# Patient Record
Sex: FEMALE | Race: WHITE | Employment: FULL TIME | URBAN - METROPOLITAN AREA
[De-identification: names, ages, dates, MRNs, and addresses within clinical notes are randomized per-mention and may not be internally consistent; named-entity substitution may affect disease eponyms.]

---

## 2017-02-07 ENCOUNTER — APPOINTMENT (OUTPATIENT)
Dept: OTHER | Facility: HOSPITAL | Age: 42
End: 2017-02-07
Attending: PREVENTIVE MEDICINE

## 2017-02-23 ENCOUNTER — APPOINTMENT (OUTPATIENT)
Dept: OTHER | Facility: HOSPITAL | Age: 42
End: 2017-02-23
Attending: PREVENTIVE MEDICINE

## 2017-05-17 PROBLEM — E66.9 OBESITY (BMI 30-39.9): Status: ACTIVE | Noted: 2017-05-17

## 2017-07-05 PROBLEM — S86.912A: Status: ACTIVE | Noted: 2017-07-05

## 2017-12-07 ENCOUNTER — APPOINTMENT (OUTPATIENT)
Dept: CT IMAGING | Facility: HOSPITAL | Age: 42
End: 2017-12-07
Attending: EMERGENCY MEDICINE
Payer: COMMERCIAL

## 2017-12-07 ENCOUNTER — HOSPITAL ENCOUNTER (EMERGENCY)
Facility: HOSPITAL | Age: 42
Discharge: HOME | End: 2017-12-07
Attending: EMERGENCY MEDICINE
Payer: COMMERCIAL

## 2017-12-07 VITALS
DIASTOLIC BLOOD PRESSURE: 70 MMHG | HEIGHT: 67.01 IN | SYSTOLIC BLOOD PRESSURE: 107 MMHG | WEIGHT: 230 LBS | HEART RATE: 74 BPM | OXYGEN SATURATION: 100 % | TEMPERATURE: 97 F | BODY MASS INDEX: 36.1 KG/M2 | RESPIRATION RATE: 20 BRPM

## 2017-12-07 DIAGNOSIS — R10.9 ABDOMINAL PAIN OF UNKNOWN ETIOLOGY: Primary | ICD-10-CM

## 2017-12-07 PROCEDURE — 74176 CT ABD & PELVIS W/O CONTRAST: CPT | Performed by: EMERGENCY MEDICINE

## 2017-12-07 PROCEDURE — 80053 COMPREHEN METABOLIC PANEL: CPT | Performed by: EMERGENCY MEDICINE

## 2017-12-07 PROCEDURE — 99284 EMERGENCY DEPT VISIT MOD MDM: CPT

## 2017-12-07 PROCEDURE — 96361 HYDRATE IV INFUSION ADD-ON: CPT

## 2017-12-07 PROCEDURE — 96374 THER/PROPH/DIAG INJ IV PUSH: CPT

## 2017-12-07 PROCEDURE — 84702 CHORIONIC GONADOTROPIN TEST: CPT | Performed by: EMERGENCY MEDICINE

## 2017-12-07 PROCEDURE — 83690 ASSAY OF LIPASE: CPT | Performed by: EMERGENCY MEDICINE

## 2017-12-07 PROCEDURE — 85025 COMPLETE CBC W/AUTO DIFF WBC: CPT | Performed by: EMERGENCY MEDICINE

## 2017-12-07 PROCEDURE — 81001 URINALYSIS AUTO W/SCOPE: CPT | Performed by: EMERGENCY MEDICINE

## 2017-12-07 RX ORDER — ONDANSETRON 4 MG/1
4 TABLET, ORALLY DISINTEGRATING ORAL EVERY 12 HOURS PRN
Qty: 10 TABLET | Refills: 0 | Status: SHIPPED | OUTPATIENT
Start: 2017-12-07 | End: 2017-12-14

## 2017-12-07 RX ORDER — ONDANSETRON 2 MG/ML
4 INJECTION INTRAMUSCULAR; INTRAVENOUS ONCE
Status: COMPLETED | OUTPATIENT
Start: 2017-12-07 | End: 2017-12-07

## 2017-12-07 RX ORDER — SODIUM CHLORIDE 9 MG/ML
125 INJECTION, SOLUTION INTRAVENOUS CONTINUOUS
Status: DISCONTINUED | OUTPATIENT
Start: 2017-12-07 | End: 2017-12-07

## 2017-12-07 NOTE — ED PROVIDER NOTES
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   Supple neck  Cardiovascular: Regular rhythm without murmurs rubs or gallops, no peripheral edema or JVD  Lungs: Speaking full sentences without any distress or retractions.  Clear to auscultation bilaterally no wheezes or rales  Abdomen: established. She still having some nausea, so she was given IV Zofran. CT of abdomen pelvis was obtained.      [facility]       Ct Appendix Abd/pel Wo Contrast (cpt=74176)    Result Date: 12/7/2017  PROCEDURE:  CT APPENDIX ABD/PEL WO CONTRAST (CPT=74176)  COMPAR CONCLUSION:  Normal appendix. Unremarkable exam.    Dictated by: Donny Schilder, DO on 12/07/2017 at 14:05     Approved by: Donny Schilder, DO                Labs and CT are reassuring.   I reviewed the results with her, and she has continued to appear kadie

## 2017-12-07 NOTE — ED INITIAL ASSESSMENT (HPI)
RLQ abdominal pain x3 months. C/o nausea this am.  Vomited x1 today.   Diarrhea x1 yesterday and belching this am.

## 2017-12-19 ENCOUNTER — HOSPITAL ENCOUNTER (EMERGENCY)
Facility: HOSPITAL | Age: 42
Discharge: HOME | End: 2017-12-19
Attending: EMERGENCY MEDICINE
Payer: OTHER MISCELLANEOUS

## 2017-12-19 VITALS
OXYGEN SATURATION: 97 % | HEART RATE: 88 BPM | DIASTOLIC BLOOD PRESSURE: 76 MMHG | SYSTOLIC BLOOD PRESSURE: 115 MMHG | TEMPERATURE: 97 F | RESPIRATION RATE: 16 BRPM

## 2017-12-19 DIAGNOSIS — T75.89XA EFFECTS OF EXPOSURE TO EXTERNAL CAUSE, INITIAL ENCOUNTER: Primary | ICD-10-CM

## 2017-12-19 PROCEDURE — 99283 EMERGENCY DEPT VISIT LOW MDM: CPT

## 2017-12-19 RX ORDER — CIPROFLOXACIN 500 MG/1
500 TABLET, FILM COATED ORAL ONCE
Status: COMPLETED | OUTPATIENT
Start: 2017-12-19 | End: 2017-12-19

## 2017-12-19 RX ORDER — CIPROFLOXACIN 500 MG/1
500 TABLET, FILM COATED ORAL EVERY 12 HOURS SCHEDULED
Status: DISCONTINUED | OUTPATIENT
Start: 2017-12-19 | End: 2017-12-19

## 2017-12-20 NOTE — ED PROVIDER NOTES
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

## 2018-02-02 ENCOUNTER — APPOINTMENT (OUTPATIENT)
Dept: OTHER | Facility: HOSPITAL | Age: 43
End: 2018-02-02
Attending: PREVENTIVE MEDICINE

## (undated) NOTE — ED AVS SNAPSHOT
Michael Cost   MRN: [id number]    Department:  [facility] Emergency Department   Date of Visit:  12/19/2017           Disclosure     Insurance plans vary and the physician(s) referred by the ER may not be covered by your plan.  Please contact y tell this physician (or your personal doctor if your instructions are to return to your personal doctor) about any new or lasting problems. The primary care or specialist physician will see patients referred from the [facility] Emergency Department.  Bj Coleman

## (undated) NOTE — ED AVS SNAPSHOT
Erlin Colon   MRN: [id number]    Department:  [facility] Emergency Department   Date of Visit:  12/7/2017           Disclosure     Insurance plans vary and the physician(s) referred by the ER may not be covered by your plan.  Please contact yo tell this physician (or your personal doctor if your instructions are to return to your personal doctor) about any new or lasting problems. The primary care or specialist physician will see patients referred from the [facility] Emergency Department.  Jani Mills